# Patient Record
Sex: MALE | Race: WHITE | NOT HISPANIC OR LATINO | URBAN - METROPOLITAN AREA
[De-identification: names, ages, dates, MRNs, and addresses within clinical notes are randomized per-mention and may not be internally consistent; named-entity substitution may affect disease eponyms.]

---

## 2022-05-24 ENCOUNTER — THERAPY VISIT (OUTPATIENT)
Dept: PHYSICAL THERAPY | Facility: CLINIC | Age: 67
End: 2022-05-24
Payer: COMMERCIAL

## 2022-05-24 DIAGNOSIS — G44.209 TENSION HEADACHE: ICD-10-CM

## 2022-05-24 DIAGNOSIS — M54.2 CERVICAL PAIN: ICD-10-CM

## 2022-05-24 PROCEDURE — 97140 MANUAL THERAPY 1/> REGIONS: CPT | Mod: GP | Performed by: PHYSICAL THERAPIST

## 2022-05-24 PROCEDURE — 97161 PT EVAL LOW COMPLEX 20 MIN: CPT | Mod: GP | Performed by: PHYSICAL THERAPIST

## 2022-05-24 PROCEDURE — 97110 THERAPEUTIC EXERCISES: CPT | Mod: GP | Performed by: PHYSICAL THERAPIST

## 2022-05-24 NOTE — LETTER
[facility]  [address] [facility] [address]  [phone]    May 25, 2022    Re: Iram Mcnamara   :   1955  MRN:  [id number]   REFERRING PHYSICIAN:   Melvin Delatorre PT     [facility]    Date of Initial Evaluation: 22  Visits:  Rxs Used: 1  Reason for Referral:     Cervical pain  Tension headache    EVALUATION SUMMARY    Physical Therapy Initial Evaluation  Subjective:    Patient Health History  Iram Mcnamara being seen for Neck and headaches.     Date of Onset: 2 years ago.   Problem occurred: Just came on   Pain is reported as 4/10 on pain scale.  General health as reported by patient is good.  Pertinent medical history includes: high blood pressure.   Red flags:  Chest pain.   Other medical allergies details: Penicillin.   Surgeries include:  Orthopedic surgery. Other surgery history details: Back fusion.    Current medications:  High blood pressure medication.    Current occupation is Retired.      Therapist Generated HPI Evaluation  Problem details: Patient has chief compliant of constant headaches in the frontal region which started insidiously about 2 years ago. Pain is worse on the right and get more intense later in the day.He also has neck stiffness and pain. He has mild relief with muscle relaxant and Tylenol, but does not take it very often. He has seen an eye doctor and neurologist and tried migraine medication, gabapentin and accupuncture with no relief. .         Type of problem:  Cervical spine.    This is a chronic condition.  Condition occurred with:  Insidious onset.  Where condition occurred: at home.  Patient reports pain:  Upper cervical spine and cervical left side (suboccipital region).  Pain is described as aching and is constant.  Pain radiates to:  Head. Pain is worse in the P.M..  Since onset symptoms are unchanged.  Re: Iram Mcnamara   :   1955    Associated symptoms:   Headache.  and relieved by nothing.    Barriers include:  None as reported by patient.    Objective:  System    Cervical/Thoracic Evaluation    AROM:  AROM Cervical:    Flexion:            50% pulls  Extension:       70%  Rotation:         Left: 50% pain     Right: 70%   Side Bend:      Left: 60%     Right:  60%    Strength: cervical=5/5  Headaches: cervical  Cervical Myotomes:  normal    Cervical Dermatomes:  normal    Cervical Palpation:    Tenderness present at Left:    Suboccipitals  Tenderness not present at Left:   Facet  Tenderness present at Right:    Suboccipitals  Tenderness not present at Right:      Facet    Cord Sign:  normal      Manual cervical traction supine tolerated well    Assessment/Plan:    Patient is a 66 year old male with cervical complaints.    Patient has the following significant findings with corresponding treatment plan.                Diagnosis 1:  Cervical pain with headache  Pain -  hot/cold therapy and education  Decreased ROM/flexibility - manual therapy, therapeutic exercise and home program  Decreased joint mobility - manual therapy, therapeutic exercise and home program  Impaired posture - neuro re-education and home program    Therapy Evaluation Codes:   1) History comprised of:   Personal factors that impact the plan of care:      None.    Comorbidity factors that impact the plan of care are:      None.     Medications impacting care: None.  2) Examination of Body Systems comprised of:   Body structures and functions that impact the plan of care:    Re: Iram Mcnamara   :   1955      Cervical spine.   Activity limitations that impact the plan of care are:      Driving and Sleeping.  3) Clinical presentation characteristics are:   Stable/Uncomplicated.  4) Decision-Making    Low complexity using standardized patient assessment instrument and/or measureable assessment of functional outcome.  Cumulative Therapy Evaluation is: Low complexity.    Previous and current  functional limitations:  (See Goal Flow Sheet for this information)    Short term and Long term goals: (See Goal Flow Sheet for this information)     Communication ability:  Patient appears to be able to clearly communicate and understand verbal and written communication and follow directions correctly.  Treatment Explanation - The following has been discussed with the patient:   RX ordered/plan of care  Anticipated outcomes  Possible risks and side effects  This patient would benefit from PT intervention to resume normal activities.   Rehab potential is excellent.    Frequency:  1 X week, once daily  Duration:  for 8 weeks  Discharge Plan:  Achieve all LTG.  Independent in home treatment program.  Reach maximal therapeutic benefit.    Thank you for your referral.    INQUIRIES  Therapist: PRICE MCFADDEN [address] [address]  Phone: [phone]  Fax: [phone]

## 2022-05-24 NOTE — PROGRESS NOTES
Physical Therapy Initial Evaluation  Subjective:    Patient Health History  Iram Mcnamara being seen for Neck and headaches.     Date of Onset: 2 years ago.   Problem occurred: Just came on   Pain is reported as 4/10 on pain scale.  General health as reported by patient is good.  Pertinent medical history includes: high blood pressure.   Red flags:  Chest pain.   Other medical allergies details: Penicillin.   Surgeries include:  Orthopedic surgery. Other surgery history details: Back fusion.    Current medications:  High blood pressure medication.    Current occupation is Retired.                     Therapist Generated HPI Evaluation  Problem details: Patient has chief compliant of constant headaches in the frontal region which started insidiously about 2 years ago. Pain is worse on the right and get more intense later in the day.He also has neck stiffness and pain. He has mild relief with muscle relaxant and Tylenol, but does not take it very often. He has seen an eye doctor and neurologist and tried migraine medication, gabapentin and accupuncture with no relief. .         Type of problem:  Cervical spine.    This is a chronic condition.  Condition occurred with:  Insidious onset.  Where condition occurred: at home.  Patient reports pain:  Upper cervical spine and cervical left side (suboccipital region).  Pain is described as aching and is constant.  Pain radiates to:  Head. Pain is worse in the P.M..  Since onset symptoms are unchanged.  Associated symptoms:  Headache.  and relieved by nothing.      Barriers include:  None as reported by patient.                        Objective:  System              Cervical/Thoracic Evaluation    AROM:  AROM Cervical:    Flexion:            50% pulls  Extension:       70%  Rotation:         Left: 50% pain     Right: 70%   Side Bend:      Left: 60%     Right:  60%    Strength: cervical=5/5  Headaches: cervical  Cervical Myotomes:  normal                      Cervical  Dermatomes:  normal                    Cervical Palpation:    Tenderness present at Left:    Suboccipitals  Tenderness not present at Left:   Facet  Tenderness present at Right:    Suboccipitals  Tenderness not present at Right:      Facet        Cord Sign:  normal                                        Manual cervical traction supine tolerated well    General     ROS    Assessment/Plan:    Patient is a 66 year old male with cervical complaints.    Patient has the following significant findings with corresponding treatment plan.                Diagnosis 1:  Cervical pain with headache  Pain -  hot/cold therapy and education  Decreased ROM/flexibility - manual therapy, therapeutic exercise and home program  Decreased joint mobility - manual therapy, therapeutic exercise and home program  Impaired posture - neuro re-education and home program    Therapy Evaluation Codes:   1) History comprised of:   Personal factors that impact the plan of care:      None.    Comorbidity factors that impact the plan of care are:      None.     Medications impacting care: None.  2) Examination of Body Systems comprised of:   Body structures and functions that impact the plan of care:      Cervical spine.   Activity limitations that impact the plan of care are:      Driving and Sleeping.  3) Clinical presentation characteristics are:   Stable/Uncomplicated.  4) Decision-Making    Low complexity using standardized patient assessment instrument and/or measureable assessment of functional outcome.  Cumulative Therapy Evaluation is: Low complexity.    Previous and current functional limitations:  (See Goal Flow Sheet for this information)    Short term and Long term goals: (See Goal Flow Sheet for this information)     Communication ability:  Patient appears to be able to clearly communicate and understand verbal and written communication and follow directions correctly.  Treatment Explanation - The following has been discussed with the  patient:   RX ordered/plan of care  Anticipated outcomes  Possible risks and side effects  This patient would benefit from PT intervention to resume normal activities.   Rehab potential is excellent.    Frequency:  1 X week, once daily  Duration:  for 8 weeks  Discharge Plan:  Achieve all LTG.  Independent in home treatment program.  Reach maximal therapeutic benefit.    Please refer to the daily flowsheet for treatment today, total treatment time and time spent performing 1:1 timed codes.

## 2022-05-31 ENCOUNTER — THERAPY VISIT (OUTPATIENT)
Dept: PHYSICAL THERAPY | Facility: CLINIC | Age: 67
End: 2022-05-31
Payer: COMMERCIAL

## 2022-05-31 DIAGNOSIS — M54.2 CERVICAL PAIN: Primary | ICD-10-CM

## 2022-05-31 DIAGNOSIS — G44.209 TENSION HEADACHE: ICD-10-CM

## 2022-05-31 PROCEDURE — 97110 THERAPEUTIC EXERCISES: CPT | Mod: GP | Performed by: PHYSICAL THERAPIST

## 2022-05-31 PROCEDURE — 97140 MANUAL THERAPY 1/> REGIONS: CPT | Mod: GP | Performed by: PHYSICAL THERAPIST

## 2022-08-08 NOTE — PROGRESS NOTES
Discharge Note        Iram failed to follow up and current status is unknown.  Please see information below for last relevant information on current status.  Patient seen for 2 visits.    SUBJECTIVE  Subjective changes noted by patient:  Patient reports improved cervical rotation and slight decrease in HAs  .  Current pain level is 2/10.     Previous pain level was  4/10.   Changes in function:  Yes (See Goal flowsheet attached for changes in current functional level)  Adverse reaction to treatment or activity: None    OBJECTIVE  Changes noted in objective findings: Cervical rotation left=70&; right=80%. Able to do cervical retraction if using mirror to assure good technique     ASSESSMENT/PLAN  Diagnosis: Cervical/HAs   Updated problem list and treatment plan:   Pain - HEP  Decreased ROM/flexibility - HEP  STG/LTGs have been met or progress has been made towards goals:  Yes, please see goal flowsheet for most current information  Assessment of Progress: current status is unknown.    Last current status: Pt is progressing well   Self Management Plans:  HEP  I have re-evaluated this patient and find that the nature, scope, duration and intensity of the therapy is appropriate for the medical condition of the patient.  Iram continues to require the following intervention to meet STG and LTG's:  HEP.    Recommendations:  Discharge with current home program.  Patient to follow up with MD as needed.    Please refer to the daily flowsheet for treatment today, total treatment time and time spent performing 1:1 timed codes.